# Patient Record
Sex: FEMALE | Race: BLACK OR AFRICAN AMERICAN | HISPANIC OR LATINO | URBAN - METROPOLITAN AREA
[De-identification: names, ages, dates, MRNs, and addresses within clinical notes are randomized per-mention and may not be internally consistent; named-entity substitution may affect disease eponyms.]

---

## 2019-07-11 ENCOUNTER — EMERGENCY (EMERGENCY)
Facility: HOSPITAL | Age: 34
LOS: 1 days | Discharge: HOME | End: 2019-07-11
Admitting: EMERGENCY MEDICINE
Payer: COMMERCIAL

## 2019-07-11 VITALS
HEART RATE: 90 BPM | SYSTOLIC BLOOD PRESSURE: 108 MMHG | RESPIRATION RATE: 18 BRPM | TEMPERATURE: 98 F | DIASTOLIC BLOOD PRESSURE: 73 MMHG | OXYGEN SATURATION: 98 %
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
## 2019-07-11 DIAGNOSIS — M25.531 PAIN IN RIGHT WRIST: ICD-10-CM
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
## 2019-07-11 DIAGNOSIS — M79.604 PAIN IN RIGHT LEG: ICD-10-CM
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
## 2019-07-11 DIAGNOSIS — M25.561 PAIN IN RIGHT KNEE: ICD-10-CM

## 2019-07-11 PROCEDURE — 99283 EMERGENCY DEPT VISIT LOW MDM: CPT | Mod: 25

## 2019-07-11 PROCEDURE — 73110 X-RAY EXAM OF WRIST: CPT | Mod: 26

## 2019-07-11 PROCEDURE — 73110 X-RAY EXAM OF WRIST: CPT

## 2019-07-11 PROCEDURE — 73110 X-RAY EXAM OF WRIST: CPT | Mod: 26,RT

## 2019-07-11 PROCEDURE — 73560 X-RAY EXAM OF KNEE 1 OR 2: CPT

## 2019-07-11 PROCEDURE — 99284 EMERGENCY DEPT VISIT MOD MDM: CPT

## 2019-07-11 PROCEDURE — 73560 X-RAY EXAM OF KNEE 1 OR 2: CPT | Mod: 26

## 2019-07-11 PROCEDURE — 73560 X-RAY EXAM OF KNEE 1 OR 2: CPT | Mod: 26,RT,76

## 2019-07-11 RX ORDER — IBUPROFEN 200 MG
600 TABLET ORAL ONCE
Refills: 0 | Status: COMPLETED | OUTPATIENT
Start: 2019-07-11 | End: 2019-07-11

## 2019-07-11 RX ADMIN — Medication 600 MILLIGRAM(S): at 23:45

## 2019-07-12 NOTE — ED PROVIDER NOTE - DIAGNOSTIC INTERPRETATION
Right wrist: no fx, no dislocation, no soft tissue swelling  Right knee:  no fx, no dislocation, no soft tissue swelling

## 2019-07-12 NOTE — ED PROVIDER NOTE - PHYSICAL EXAMINATION
GEN: WD/WN, NAD  HEAD: NC/AT; no periorbital ecchymosis or Lares's sign  NEURO: Alert, oriented. CN II-XII intact. Clear speech.  SILT all ext. Motor 5/5 all ext. F-N intact.  Gait steady.   EYE: PERRL, EOMI.   ENT: Airway patent.  No dental injury. No epistaxis or rhinorrhea. No otorrhea or hemotympanum.  PULM: No resp distress. Lungs CTA bilat.  CV: RRR, S1S2  GI: Abdomen soft, nontender  MSK: Neck with painless ROM; no midline neck tenderness.  Extremities: mild TTP base of right thenar eminence. no wrist or snuffbox tenderness, no snuffbox pain with axial load of thumb.  TTP tibial tuberosity.  FROM, no lig laxity. GEN: WD/WN, NAD  HEAD: NC/AT; no periorbital ecchymosis or Lares's sign  NEURO: Alert, oriented. CN II-XII intact. Clear speech.  SILT all ext. Motor 5/5 all ext. F-N intact.  Gait steady.   EYE: PERRL, EOMI.   ENT: Airway patent.  No dental injury. No epistaxis or rhinorrhea. No otorrhea.  PULM: No resp distress. Lungs CTA bilat.  CV: RRR, S1S2  GI: Abdomen soft, nontender  MSK: Neck with painless ROM; no midline neck tenderness.  Extremities: mild TTP base of right thenar eminence. no wrist or snuffbox tenderness, no snuffbox pain with axial load of thumb.  TTP tibial tuberosity.  FROM, no lig laxity.  Antalgic limp.

## 2019-07-12 NOTE — ED PROVIDER NOTE - NS ED ROS FT
NEURO: no LOC, head injury/headache, dizziness, weakness, focal weakness/tingling/numbness   EYE: no change in vision or eye injury   ENT: no epistaxis, rhinorrhea, otorrhea, dental injury   PULM: no difficulty breathing   CV: no chest pain or palpitations; no syncope   GI: no abdominal pain or vomiting   : no pelvic pain, no genital injury   MSK: no neck or back pain  SKIN: no abrasions or lacerations

## 2019-07-12 NOTE — ED ADULT NURSE REASSESSMENT NOTE - NS ED NURSE REASSESS COMMENT FT1
knee immobilizer applied to right leg and wrist splint applied to right wrist.  cane given with return demo by pt.

## 2019-07-12 NOTE — ED PROVIDER NOTE - OBJECTIVE STATEMENT
32 yo LHD fem without significant PMH c/o right knee and right wrist pain after mechanical fall 1 hr ago[place]  [place]. was able to get up and walk up stairs with assistance.

## 2019-07-12 NOTE — ED PROVIDER NOTE - NSFOLLOWUPINSTRUCTIONS_ED_ALL_ED_FT
Wear the knee immobilizer for only 2-3 days.  Then switch to ACE bandage.  Wear the wrist splint for comfort.  Ibuprofen if needed for pain.  Follow up at orthopedic clinic next week - call to schedule [phone]  _______________________________________    KNEE IMMOBILIZER - AfterCare(R) Instructions(ER/ED)     Knee Immobilizer    WHAT YOU NEED TO KNOW:    A knee immobilizer limits knee movement. It is used after an injury or surgery to help your knee, muscles, or tendons heal.     DISCHARGE INSTRUCTIONS:    How to safely use a knee immobilizer:     Have your knee immobilizer fitted by your healthcare provider. It is important that your knee immobilizer is the right size for you and that it fits properly.       Wear your knee immobilizer as directed. It can be worn over your clothing. Check the fit of the knee immobilizer often. If it does not fit properly or slips out of place, it could cause further injury.       Use crutches as directed. You may need to avoid putting weight on your injured leg. Your healthcare provider will tell you if you need crutches and for how long.       Inspect your knee immobilizer often. Do not wear your knee immobilizer if it is damaged or broken. You may need to replace it if it becomes worn.       Ask your healthcare provider how to care for your knee immobilizer. You may be able to hand wash the fabric with mild soap and water. Do not place it in the washer or dryer.       Go to physical therapy as directed. A physical therapist can help you strengthen the muscles in your leg and help your knee heal.     Contact your healthcare provider if:     Your knee pain becomes worse when you wear your knee immobilizer.       Your skin is sore or raw after you wear your knee immobilizer.       Your leg feels numb or swells while you wear your knee immobilizer.       Your knee immobilizer is damaged.       You have questions or concerns about your condition or care.     Return to the emergency department if:     You have severe swelling or pain in your leg or knee.

## 2019-07-12 NOTE — ED PROVIDER NOTE - CLINICAL SUMMARY MEDICAL DECISION MAKING FREE TEXT BOX
Mechanical fall  - injuries to right hand thenar eminence and right knee. NVI, no breaks in skin. XR neg for fx.  No snuffbox tenderness or wrist tenderness to suggest scaphoid fx.  No evidence of injuries to head/neck/thorax/abdomen.  Plan: splint wrist, knee immobilizer/ACE bandage to right knee.  Ortho follow up after weekend.

## 2025-04-01 NOTE — ED ADULT NURSE NOTE - GENITOURINARY ASSESSMENT
WDL [Normal] : affect appropriate [de-identified] : Normoactive bowel sounds, soft and nontender, no hepatosplenomegaly or masses noted